# Patient Record
Sex: FEMALE | Race: WHITE | Employment: UNEMPLOYED | URBAN - METROPOLITAN AREA
[De-identification: names, ages, dates, MRNs, and addresses within clinical notes are randomized per-mention and may not be internally consistent; named-entity substitution may affect disease eponyms.]

---

## 2017-01-03 ENCOUNTER — HOSPITAL ENCOUNTER (EMERGENCY)
Age: 47
Discharge: HOME | End: 2017-01-03
Attending: EMERGENCY MEDICINE

## 2017-01-03 ENCOUNTER — APPOINTMENT (OUTPATIENT)
Dept: GENERAL RADIOLOGY | Age: 47
End: 2017-01-03
Attending: EMERGENCY MEDICINE

## 2017-01-03 VITALS
HEART RATE: 101 BPM | DIASTOLIC BLOOD PRESSURE: 72 MMHG | BODY MASS INDEX: 31 KG/M2 | OXYGEN SATURATION: 98 % | RESPIRATION RATE: 16 BRPM | TEMPERATURE: 98 F | SYSTOLIC BLOOD PRESSURE: 121 MMHG | WEIGHT: 188 LBS

## 2017-01-03 DIAGNOSIS — J40 BRONCHITIS: Primary | ICD-10-CM

## 2017-01-03 PROCEDURE — 71020 XR CHEST PA + LAT CHEST (CPT=71020): CPT

## 2017-01-03 PROCEDURE — 99283 EMERGENCY DEPT VISIT LOW MDM: CPT

## 2017-01-03 RX ORDER — CEFDINIR 300 MG/1
300 CAPSULE ORAL 2 TIMES DAILY
COMMUNITY
End: 2017-01-21 | Stop reason: ALTCHOICE

## 2017-01-03 RX ORDER — METHYLPREDNISOLONE 4 MG/1
TABLET ORAL
Qty: 1 PACKAGE | Refills: 0 | Status: SHIPPED | OUTPATIENT
Start: 2017-01-03 | End: 2017-01-08

## 2017-01-03 RX ORDER — IPRATROPIUM BROMIDE AND ALBUTEROL SULFATE 2.5; .5 MG/3ML; MG/3ML
3 SOLUTION RESPIRATORY (INHALATION) ONCE
Status: COMPLETED | OUTPATIENT
Start: 2017-01-03 | End: 2017-01-03

## 2017-01-03 NOTE — ED INITIAL ASSESSMENT (HPI)
PT TO ED FOR COUGH. 3 WEEKS AGO PLACED ON ANTIBX AND STEROIDS FOR BRONCHITIS. SECOND ROUND STEROIDS 12/22 FOR CONTINUED COUGH AND WHEEZING. 12/26 WAS SEEN FOR FOLLOW UP AND HAD CHEST XRAY AND DC WITH BREATHING TREATMENTS.  BREATHING AND COUGH WORSE AND WENT

## 2017-01-03 NOTE — ED PROVIDER NOTES
Patient Seen in: THE [facility] Emergency Department In [place]    History   Patient presents with:  Dyspnea HUEY SOB (respiratory)    Stated Complaint: coughing x 1 week. had antibiotic and prednisone    HPI  Patient is a 60-year-old female who states that fo AFFECTED AREA OF SCALP TWICE DAILY FOR 2 WEEKS   BETAMETHASONE DIPROPIONATE 0.05 % External Lotion,  APPLY TO THE AFFECTED AREA OF SCALP TWICE DAILY FOR 2 WEEKS AS NEEDED   Ketoconazole 2 % External Shampoo,  Apply to AA's of scalp 2-3 times a week.    Phen ED Triage Vitals   BP 01/03/17 1618 121/72 mmHg   Pulse 01/03/17 1618 101   Resp 01/03/17 1618 16   Temp 01/03/17 1618 97.8 °F (36.6 °C)   Temp src 01/03/17 1618 Temporal   SpO2 01/03/17 1618 98 %   O2 Device 01/03/17 1618 None (Room air)       [place]

## 2017-01-21 ENCOUNTER — APPOINTMENT (OUTPATIENT)
Dept: GENERAL RADIOLOGY | Age: 47
End: 2017-01-21
Attending: EMERGENCY MEDICINE
Payer: COMMERCIAL

## 2017-01-21 ENCOUNTER — APPOINTMENT (OUTPATIENT)
Dept: CT IMAGING | Age: 47
End: 2017-01-21
Attending: EMERGENCY MEDICINE
Payer: COMMERCIAL

## 2017-01-21 ENCOUNTER — HOSPITAL ENCOUNTER (EMERGENCY)
Age: 47
Discharge: HOME | End: 2017-01-21
Attending: EMERGENCY MEDICINE
Payer: COMMERCIAL

## 2017-01-21 VITALS
SYSTOLIC BLOOD PRESSURE: 125 MMHG | DIASTOLIC BLOOD PRESSURE: 77 MMHG | HEART RATE: 73 BPM | BODY MASS INDEX: 36.91 KG/M2 | RESPIRATION RATE: 18 BRPM | TEMPERATURE: 98 F | OXYGEN SATURATION: 97 % | WEIGHT: 188 LBS | HEIGHT: 60 IN

## 2017-01-21 DIAGNOSIS — R10.9 ABDOMINAL PAIN OF UNKNOWN ETIOLOGY: Primary | ICD-10-CM

## 2017-01-21 LAB
ALBUMIN SERPL-MCNC: 3.5 G/DL (ref 3.5–4.8)
ALP LIVER SERPL-CCNC: 86 U/L (ref 39–100)
ALT SERPL-CCNC: 32 U/L (ref 14–54)
AST SERPL-CCNC: 26 U/L (ref 15–41)
BASOPHILS # BLD AUTO: 0.05 X10(3) UL (ref 0–0.1)
BASOPHILS NFR BLD AUTO: 0.5 %
BILIRUB SERPL-MCNC: 0.3 MG/DL (ref 0.1–2)
BILIRUB UR QL STRIP.AUTO: NEGATIVE
BUN BLD-MCNC: 11 MG/DL (ref 8–20)
CALCIUM BLD-MCNC: 8.8 MG/DL (ref 8.3–10.3)
CHLORIDE: 107 MMOL/L (ref 101–111)
CLARITY UR REFRACT.AUTO: CLEAR
CO2: 24 MMOL/L (ref 22–32)
COLOR UR AUTO: YELLOW
CREAT BLD-MCNC: 0.69 MG/DL (ref 0.55–1.02)
EOSINOPHIL # BLD AUTO: 0.2 X10(3) UL (ref 0–0.3)
EOSINOPHIL NFR BLD AUTO: 2 %
ERYTHROCYTE [DISTWIDTH] IN BLOOD BY AUTOMATED COUNT: 14.2 % (ref 11.5–16)
GLUCOSE BLD-MCNC: 86 MG/DL (ref 70–99)
GLUCOSE UR STRIP.AUTO-MCNC: NEGATIVE MG/DL
HCT VFR BLD AUTO: 43.1 % (ref 34–50)
HGB BLD-MCNC: 13.7 G/DL (ref 12–16)
IMMATURE GRANULOCYTE COUNT: 0.06 X10(3) UL (ref 0–1)
IMMATURE GRANULOCYTE RATIO %: 0.6 %
KETONES UR STRIP.AUTO-MCNC: NEGATIVE MG/DL
LEUKOCYTE ESTERASE UR QL STRIP.AUTO: NEGATIVE
LIPASE: 78 U/L (ref 73–393)
LYMPHOCYTES # BLD AUTO: 2.66 X10(3) UL (ref 0.9–4)
LYMPHOCYTES NFR BLD AUTO: 26.9 %
M PROTEIN MFR SERPL ELPH: 7.2 G/DL (ref 6.1–8.3)
MCH RBC QN AUTO: 28.2 PG (ref 27–33.2)
MCHC RBC AUTO-ENTMCNC: 31.8 G/DL (ref 31–37)
MCV RBC AUTO: 88.9 FL (ref 81–100)
MONOCYTES # BLD AUTO: 0.7 X10(3) UL (ref 0.1–0.6)
MONOCYTES NFR BLD AUTO: 7.1 %
NEUTROPHIL ABS PRELIM: 6.21 X10 (3) UL (ref 1.3–6.7)
NEUTROPHILS # BLD AUTO: 6.21 X10(3) UL (ref 1.3–6.7)
NEUTROPHILS NFR BLD AUTO: 62.9 %
NITRITE UR QL STRIP.AUTO: NEGATIVE
PH UR STRIP.AUTO: 5 [PH] (ref 4.5–8)
PLATELET # BLD AUTO: 236 10(3)UL (ref 150–450)
POTASSIUM SERPL-SCNC: 4 MMOL/L (ref 3.6–5.1)
PROT UR STRIP.AUTO-MCNC: NEGATIVE MG/DL
RBC # BLD AUTO: 4.85 X10(6)UL (ref 3.8–5.1)
RBC UR QL AUTO: NEGATIVE
RED CELL DISTRIBUTION WIDTH-SD: 46.1 FL (ref 35.1–46.3)
SODIUM SERPL-SCNC: 140 MMOL/L (ref 136–144)
SP GR UR STRIP.AUTO: <=1.005 (ref 1–1.03)
UROBILINOGEN UR STRIP.AUTO-MCNC: 0.2 MG/DL
WBC # BLD AUTO: 9.9 X10(3) UL (ref 4–13)

## 2017-01-21 PROCEDURE — 83690 ASSAY OF LIPASE: CPT | Performed by: EMERGENCY MEDICINE

## 2017-01-21 PROCEDURE — 74177 CT ABD & PELVIS W/CONTRAST: CPT

## 2017-01-21 PROCEDURE — 80053 COMPREHEN METABOLIC PANEL: CPT | Performed by: EMERGENCY MEDICINE

## 2017-01-21 PROCEDURE — 71020 XR CHEST PA + LAT CHEST (CPT=71020): CPT

## 2017-01-21 PROCEDURE — 81003 URINALYSIS AUTO W/O SCOPE: CPT | Performed by: EMERGENCY MEDICINE

## 2017-01-21 PROCEDURE — 85025 COMPLETE CBC W/AUTO DIFF WBC: CPT | Performed by: EMERGENCY MEDICINE

## 2017-01-21 PROCEDURE — 99284 EMERGENCY DEPT VISIT MOD MDM: CPT

## 2017-01-21 PROCEDURE — 36415 COLL VENOUS BLD VENIPUNCTURE: CPT

## 2017-01-21 NOTE — ED INITIAL ASSESSMENT (HPI)
Patient states that she has been having bloating for about 1 year. Patient states that she has been on 4 different rounds of steroids for the past month or so because of bronchitis. Patient also states that she is still congested and coughing.

## 2017-01-21 NOTE — ED PROVIDER NOTES
Patient Seen in: [facility] In [place]    History   Patient presents with:  Abdomen/Flank Pain (GI/)  Headache (neurologic)    Stated Complaint: abdominal pain, headache    HPI    Patient presents complaining of upper abdominal bloa 88 MCG Oral Tab,  Take 88 mcg by mouth before breakfast.     Phentermine HCl 37.5 MG Oral Tab,  Take 37.5 mg by mouth every morning before breakfast.         No family history on file.       Smoking Status: Current Some Day Smoker         Packs/Day: 0.00  Y alert and oriented to person, place, and time. She has normal strength. No sensory deficit. Skin: Skin is warm and dry. No rash noted. Nursing note and vitals reviewed.            ED Course     Labs Reviewed   CBC W/ DIFFERENTIAL - Abnormal; Notable for

## 2017-08-22 PROBLEM — R70.0 ESR RAISED: Status: ACTIVE | Noted: 2017-08-22

## 2017-09-27 PROCEDURE — 82570 ASSAY OF URINE CREATININE: CPT | Performed by: FAMILY MEDICINE

## 2017-09-27 PROCEDURE — 82043 UR ALBUMIN QUANTITATIVE: CPT | Performed by: FAMILY MEDICINE

## 2018-05-01 PROCEDURE — 81003 URINALYSIS AUTO W/O SCOPE: CPT | Performed by: FAMILY MEDICINE

## 2018-05-16 PROCEDURE — 88305 TISSUE EXAM BY PATHOLOGIST: CPT | Performed by: RADIOLOGY

## 2018-05-31 PROCEDURE — 81001 URINALYSIS AUTO W/SCOPE: CPT | Performed by: FAMILY MEDICINE

## 2018-06-01 PROBLEM — R94.31 ABNORMAL EKG: Status: ACTIVE | Noted: 2018-06-01

## 2018-06-01 PROBLEM — Z98.890 S/P DECOMPRESSION OF ULNAR NERVE AT ELBOW: Status: ACTIVE | Noted: 2018-06-01

## 2018-06-01 PROBLEM — R94.31 ABNORMAL EKG: Status: RESOLVED | Noted: 2018-06-01 | Resolved: 2018-06-01

## 2018-06-01 PROBLEM — R82.90 ABNORMAL URINALYSIS: Status: ACTIVE | Noted: 2018-06-01

## 2018-07-02 PROCEDURE — 81003 URINALYSIS AUTO W/O SCOPE: CPT | Performed by: FAMILY MEDICINE

## 2018-07-10 PROBLEM — R81 GLYCOSURIA: Status: ACTIVE | Noted: 2018-07-10

## 2018-07-10 PROBLEM — N95.0 POST-MENOPAUSAL BLEEDING: Status: ACTIVE | Noted: 2018-07-10

## 2018-07-18 PROBLEM — M72.2 PLANTAR FASCIITIS, RIGHT: Status: ACTIVE | Noted: 2018-07-18

## 2018-10-10 PROBLEM — R82.90 ABNORMAL URINALYSIS: Status: RESOLVED | Noted: 2018-06-01 | Resolved: 2018-10-10

## 2018-10-24 PROBLEM — IMO0001 SMOKING: Status: ACTIVE | Noted: 2018-10-24

## 2018-10-24 PROBLEM — R70.0 ESR RAISED: Status: RESOLVED | Noted: 2017-08-22 | Resolved: 2018-10-24

## 2018-10-24 PROBLEM — F17.200 SMOKING: Status: ACTIVE | Noted: 2018-10-24

## 2019-12-17 PROBLEM — J01.01 ACUTE RECURRENT MAXILLARY SINUSITIS: Status: ACTIVE | Noted: 2019-12-17

## 2019-12-17 PROBLEM — R20.0 NUMBNESS: Status: ACTIVE | Noted: 2019-12-17

## 2019-12-31 PROBLEM — S03.00XA DISLOCATION OF TEMPOROMANDIBULAR JOINT, INITIAL ENCOUNTER: Status: ACTIVE | Noted: 2019-12-31

## 2019-12-31 PROBLEM — M26.622 ARTHRALGIA OF LEFT TEMPOROMANDIBULAR JOINT: Status: ACTIVE | Noted: 2019-12-31

## 2020-02-04 PROBLEM — J01.01 ACUTE RECURRENT MAXILLARY SINUSITIS: Status: RESOLVED | Noted: 2019-12-17 | Resolved: 2020-02-04

## 2020-02-04 PROBLEM — M54.2 NECK PAIN: Status: ACTIVE | Noted: 2020-02-04

## 2020-03-09 PROBLEM — N95.0 POST-MENOPAUSAL BLEEDING: Status: RESOLVED | Noted: 2018-07-10 | Resolved: 2020-03-09

## 2020-03-09 PROBLEM — R81 GLYCOSURIA: Status: RESOLVED | Noted: 2018-07-10 | Resolved: 2020-03-09

## 2020-12-08 PROBLEM — F41.9 ANXIETY: Status: ACTIVE | Noted: 2020-12-08

## 2020-12-08 PROBLEM — E55.9 VITAMIN D DEFICIENCY: Status: ACTIVE | Noted: 2020-12-08

## 2021-05-24 PROBLEM — Z28.21 COVID-19 VACCINATION DECLINED: Status: ACTIVE | Noted: 2021-05-24

## 2021-06-04 PROBLEM — E66.9 OBESITY (BMI 30-39.9): Status: ACTIVE | Noted: 2021-06-04

## 2021-06-04 PROBLEM — R14.0 ABDOMINAL BLOATING: Status: ACTIVE | Noted: 2021-06-04

## 2021-06-18 PROBLEM — Z88.9 MULTIPLE ALLERGIES: Status: ACTIVE | Noted: 2021-06-18

## 2021-08-13 PROBLEM — K11.8 MASS OF LEFT PAROTID GLAND: Status: ACTIVE | Noted: 2021-08-13

## 2021-08-13 PROBLEM — R06.83 SNORING: Status: ACTIVE | Noted: 2021-08-13

## 2021-08-13 PROBLEM — G43.809 OTHER MIGRAINE WITHOUT STATUS MIGRAINOSUS, NOT INTRACTABLE: Status: ACTIVE | Noted: 2021-08-13

## 2021-08-13 PROBLEM — J01.00 ACUTE MAXILLARY SINUSITIS, RECURRENCE NOT SPECIFIED: Status: ACTIVE | Noted: 2021-08-13

## 2021-08-30 PROBLEM — G47.30 SLEEP APNEA: Status: ACTIVE | Noted: 2021-08-30

## 2021-08-30 PROBLEM — E11.9 TYPE 2 DIABETES MELLITUS WITHOUT COMPLICATION (HCC): Status: ACTIVE | Noted: 2021-08-30

## 2021-10-14 PROBLEM — R51.9 NONINTRACTABLE HEADACHE, UNSPECIFIED CHRONICITY PATTERN, UNSPECIFIED HEADACHE TYPE: Status: ACTIVE | Noted: 2021-10-14

## 2021-10-14 PROBLEM — L29.9 ITCHING: Status: ACTIVE | Noted: 2021-10-14

## 2021-12-17 PROBLEM — J01.00 ACUTE MAXILLARY SINUSITIS, RECURRENCE NOT SPECIFIED: Status: RESOLVED | Noted: 2021-08-13 | Resolved: 2021-12-17

## 2021-12-17 PROBLEM — L29.9 ITCHING: Status: RESOLVED | Noted: 2021-10-14 | Resolved: 2021-12-17

## 2022-01-20 PROBLEM — I65.23 CAROTID ARTERY PLAQUE, BILATERAL: Status: ACTIVE | Noted: 2022-01-20

## 2022-01-20 PROBLEM — J44.89 CHRONIC OBSTRUCTIVE ASTHMA (HCC): Status: ACTIVE | Noted: 2022-01-20

## 2022-01-20 PROBLEM — J44.89 CHRONIC OBSTRUCTIVE ASTHMA: Status: ACTIVE | Noted: 2022-01-20

## 2022-01-20 PROBLEM — R06.83 SNORING: Status: RESOLVED | Noted: 2021-08-13 | Resolved: 2022-01-20

## 2022-01-20 PROBLEM — M26.609 TMJ (TEMPOROMANDIBULAR JOINT SYNDROME): Status: ACTIVE | Noted: 2019-12-31

## 2022-01-20 PROBLEM — J44.9 CHRONIC OBSTRUCTIVE ASTHMA (HCC): Status: ACTIVE | Noted: 2022-01-20

## 2022-02-17 PROBLEM — H81.10 BENIGN PAROXYSMAL POSITIONAL VERTIGO, UNSPECIFIED LATERALITY: Status: ACTIVE | Noted: 2022-02-17

## 2022-08-26 ENCOUNTER — ORDER TRANSCRIPTION (OUTPATIENT)
Dept: SLEEP CENTER | Age: 52
End: 2022-08-26

## 2022-08-26 DIAGNOSIS — R06.83 SNORING: Primary | ICD-10-CM

## 2023-02-17 ENCOUNTER — TELEPHONE (OUTPATIENT)
Dept: SLEEP CENTER | Age: 53
End: 2023-02-17

## 2023-03-28 ENCOUNTER — OFFICE VISIT (OUTPATIENT)
Dept: SLEEP CENTER | Age: 53
End: 2023-03-28
Attending: Other
Payer: MEDICAID

## 2023-03-28 DIAGNOSIS — R06.83 SNORING: ICD-10-CM

## 2023-03-28 PROCEDURE — 95806 SLEEP STUDY UNATT&RESP EFFT: CPT

## 2024-02-13 RX ORDER — HYDROCODONE BITARTRATE AND ACETAMINOPHEN 5; 325 MG/1; MG/1
1 TABLET ORAL EVERY 6 HOURS PRN
COMMUNITY

## 2024-02-15 ENCOUNTER — ANESTHESIA EVENT (OUTPATIENT)
Dept: ENDOSCOPY | Facility: HOSPITAL | Age: 54
End: 2024-02-15
Payer: MEDICAID

## 2024-02-16 ENCOUNTER — ANESTHESIA (OUTPATIENT)
Dept: ENDOSCOPY | Facility: HOSPITAL | Age: 54
End: 2024-02-16
Payer: MEDICAID

## 2024-02-16 ENCOUNTER — HOSPITAL ENCOUNTER (OUTPATIENT)
Facility: HOSPITAL | Age: 54
Setting detail: HOSPITAL OUTPATIENT SURGERY
Discharge: HOME | End: 2024-02-16
Attending: INTERNAL MEDICINE | Admitting: INTERNAL MEDICINE
Payer: MEDICAID

## 2024-02-16 VITALS
DIASTOLIC BLOOD PRESSURE: 70 MMHG | SYSTOLIC BLOOD PRESSURE: 114 MMHG | BODY MASS INDEX: 38.25 KG/M2 | RESPIRATION RATE: 18 BRPM | HEART RATE: 92 BPM | OXYGEN SATURATION: 94 % | TEMPERATURE: 98 F | HEIGHT: 60.5 IN | WEIGHT: 200 LBS

## 2024-02-16 LAB — GLUCOSE BLD-MCNC: 125 MG/DL (ref 70–99)

## 2024-02-16 PROCEDURE — 82962 GLUCOSE BLOOD TEST: CPT

## 2024-02-16 PROCEDURE — 0DB68ZX EXCISION OF STOMACH, VIA NATURAL OR ARTIFICIAL OPENING ENDOSCOPIC, DIAGNOSTIC: ICD-10-PCS | Performed by: INTERNAL MEDICINE

## 2024-02-16 PROCEDURE — 88305 TISSUE EXAM BY PATHOLOGIST: CPT | Performed by: INTERNAL MEDICINE

## 2024-02-16 PROCEDURE — 0DB58ZX EXCISION OF ESOPHAGUS, VIA NATURAL OR ARTIFICIAL OPENING ENDOSCOPIC, DIAGNOSTIC: ICD-10-PCS | Performed by: INTERNAL MEDICINE

## 2024-02-16 RX ORDER — LIDOCAINE HYDROCHLORIDE 10 MG/ML
INJECTION, SOLUTION EPIDURAL; INFILTRATION; INTRACAUDAL; PERINEURAL AS NEEDED
Status: DISCONTINUED | OUTPATIENT
Start: 2024-02-16 | End: 2024-02-16 | Stop reason: SURG

## 2024-02-16 RX ORDER — DEXTROSE MONOHYDRATE 25 G/50ML
50 INJECTION, SOLUTION INTRAVENOUS
Status: DISCONTINUED | OUTPATIENT
Start: 2024-02-16 | End: 2024-02-16

## 2024-02-16 RX ORDER — NICOTINE POLACRILEX 4 MG
30 LOZENGE BUCCAL
Status: DISCONTINUED | OUTPATIENT
Start: 2024-02-16 | End: 2024-02-16

## 2024-02-16 RX ORDER — SODIUM CHLORIDE, SODIUM LACTATE, POTASSIUM CHLORIDE, CALCIUM CHLORIDE 600; 310; 30; 20 MG/100ML; MG/100ML; MG/100ML; MG/100ML
INJECTION, SOLUTION INTRAVENOUS CONTINUOUS
Status: DISCONTINUED | OUTPATIENT
Start: 2024-02-16 | End: 2024-02-16

## 2024-02-16 RX ORDER — NICOTINE POLACRILEX 4 MG
15 LOZENGE BUCCAL
Status: DISCONTINUED | OUTPATIENT
Start: 2024-02-16 | End: 2024-02-16

## 2024-02-16 RX ADMIN — LIDOCAINE HYDROCHLORIDE 25 MG: 10 INJECTION, SOLUTION EPIDURAL; INFILTRATION; INTRACAUDAL; PERINEURAL at 13:55:00

## 2024-02-16 RX ADMIN — SODIUM CHLORIDE, SODIUM LACTATE, POTASSIUM CHLORIDE, CALCIUM CHLORIDE: 600; 310; 30; 20 INJECTION, SOLUTION INTRAVENOUS at 13:52:00

## 2024-02-16 NOTE — ANESTHESIA PREPROCEDURE EVALUATION
PRE-OP EVALUATION    Patient Name: Keshia Holloway    Admit Diagnosis: GERD WITHOUT ESOPHAGITIS; DYSPHAGIA, UNSPECIFIED TYPE    Pre-op Diagnosis: GERD WITHOUT ESOPHAGITIS; DYSPHAGIA, UNSPECIFIED TYPE    ESOPHAGOGASTRODUODENOSCOPY (EGD)    Anesthesia Procedure: ESOPHAGOGASTRODUODENOSCOPY (EGD)    Surgeon(s) and Role:     * Teo Beach MD - Primary    Pre-op vitals reviewed.  Temp: 98.2 °F (36.8 °C)  Pulse: 91  Resp: 18  BP: 142/85  SpO2: 98 %  Body mass index is 38.42 kg/m².    Current medications reviewed.  Hospital Medications:   glucose (Dex4) 15 GM/59ML oral liquid 15 g  15 g Oral Q15 Min PRN    Or    glucose (Glutose) 40% oral gel 15 g  15 g Oral Q15 Min PRN    Or    glucose-vitamin C (Dex-4) chewable tab 4 tablet  4 tablet Oral Q15 Min PRN    Or    dextrose 50% injection 50 mL  50 mL Intravenous Q15 Min PRN    Or    glucose (Dex4) 15 GM/59ML oral liquid 30 g  30 g Oral Q15 Min PRN    Or    glucose (Glutose) 40% oral gel 30 g  30 g Oral Q15 Min PRN    Or    glucose-vitamin C (Dex-4) chewable tab 8 tablet  8 tablet Oral Q15 Min PRN    lactated ringers infusion   Intravenous Continuous       Outpatient Medications:     Medications Prior to Admission   Medication Sig Dispense Refill Last Dose    Dulaglutide (TRULICITY SC) Inject into the skin once a week. sunday 2/11/2024    Mometasone Furo-Formoterol Fum (DULERA IN) Inhale into the lungs.   2/15/2024    lidocaine-menthol 4-1 % External Patch Place 1 patch onto the skin daily.       HYDROcodone-acetaminophen 5-325 MG Oral Tab Take 1 tablet by mouth every 6 (six) hours as needed for Pain.   2/15/2024    ACCU-CHEK RONDA PLUS In Vitro Strip 1 each by Other route daily. 90 strip 0     ACCU-CHEK MULTICLIX LANCETS Does not apply Misc 1 each by Other route daily. 90 each 0     albuterol (VENTOLIN HFA) 108 (90 Base) MCG/ACT Inhalation Aero Soln INHALE 1 PUFF EVERY 4 HOURS AS NEEDED FOR WHEEZING 18 g 1 2/15/2024    levothyroxine (SYNTHROID) 75 MCG Oral Tab Take 1  tablet (75 mcg total) by mouth before breakfast. 90 tablet 1 2/15/2024    Phentermine HCl 37.5 MG Oral Tab Take 1 tablet (37.5 mg total) by mouth as needed.   Past Week    Azelastine HCl 0.1 % Nasal Solution 1 spray by Nasal route 2 (two) times daily. 1 each 0     estradiol 0.1 MG/GM Vaginal Cream PLACE 2 GRAMS VAGINALLY EVERY NIGHT AT BEDTIME FOR 2 WEEKS AS DIRECTED THEN PLACE 1 GRAM VAGINALLY 2 TIMES EVERY WEEK       Blood Glucose Monitoring Suppl (ONETOUCH VERIO FLEX SYSTEM) w/Device Does not apply Kit Use to check blood sugar twice daily Dx: E11.65, no insulin 1 kit 0     Glucose Blood (ONETOUCH VERIO) In Vitro Strip Patient to test twice a day. Dx e11.9 180 strip 3     Olopatadine HCl 0.2 % Ophthalmic Solution INT 1 GTT INTO OU QD PRN   2/15/2024    NURTEC 75 MG Oral Tablet Dispersible        triamcinolone acetonide 0.1 % External Cream         diphenhydrAMINE HCl 25 MG Oral Cap Take 1 capsule (25 mg total) by mouth every 6 (six) hours as needed for Itching.   2/15/2024    ondansetron 4 MG Oral Tablet Dispersible ondansetron 4 mg disintegrating tablet q 12 hours prn 20 tablet 0 Past Month    lidocaine 5 % External Ointment Apply 1 Application topically 2 (two) times daily. 1 Tube 3     Glucose Blood (ONETOUCH VERIO) In Vitro Strip Use to check blood sugar twice daily Dx: E11.65, no insulin 200 strip 3     OneTouch Delica Lancets 33G Does not apply Misc Use to check blood sugar twice daily Dx: E11.65, no insulin 200 each 3     SUMAtriptan Succinate 100 MG Oral Tab 75 mg.   More than a month    nystatin [id number] UNIT/GM External Ointment PAIGE TO GROIN Q NIGHT          Allergies: Hydrocodone-acetaminophen, Ibuprofen, Morphine, and Naprosyn  [naproxen]      Anesthesia Evaluation    Patient summary reviewed.    Anesthetic Complications  (-) history of anesthetic complications         GI/Hepatic/Renal    Negative GI/hepatic/renal ROS.                             Cardiovascular        Exercise tolerance: good     MET:  >4    (+) obesity                                       Endo/Other      (+) diabetes  type 2,   (+) hypothyroidism                       Pulmonary      (+) asthma  (+) COPD            (+) sleep apnea       Neuro/Psych                   (+) headaches                   Past Surgical History:   Procedure Laterality Date          D & C  2018    ELBOW SURGERY Right     2016    KNEE SURGERY Right 10/2015    NEEDLE BIOPSY RIGHT  2018    US guided bx Right @ 10:00, 8cm FN= Infinity clip    OTHER SURGICAL HISTORY      septoplasty    OTHER SURGICAL HISTORY      invitro    TONSILLECTOMY      WISDOM TEETH REMOVED       Social History     Socioeconomic History    Marital status:    Tobacco Use    Smoking status: Some Days     Packs/day: 0.50     Years: 15.00     Additional pack years: 0.00     Total pack years: 7.50     Types: Cigarettes    Smokeless tobacco: Never    Tobacco comments:     currently down to half a pack a day   Vaping Use    Vaping Use: Never used   Substance and Sexual Activity    Alcohol use: No     Comment: 1 year    Drug use: No     History   Drug Use No     Available pre-op labs reviewed.               Airway      Mallampati: III  Mouth opening: >3 FB  TM distance: > 6 cm  Neck ROM: full Cardiovascular    Cardiovascular exam normal.  Rhythm: regular  Rate: normal     Dental    Dentition appears grossly intact         Pulmonary    Pulmonary exam normal.  Breath sounds clear to auscultation bilaterally.               Other findings              ASA: 3   Plan: MAC  NPO status verified and patient meets guidelines.  Patient has not taken beta blockers in last 24 hours.  Post-procedure pain management plan discussed with surgeon and patient.      Plan/risks discussed with: patient                Present on Admission:  **None**

## 2024-02-16 NOTE — H&P
History and Physical for Endoscopic Procedure      Keshia Holloway Patient Status:  Hospital Outpatient Surgery    10/4/1970 MRN [id number]   Location [facility] Attending Teo Beach MD   Hosp Day # 0 PCP James Oreilly MD     Date of Consult:  24    Reason for Consultation:  dysphagia    History of Present Illness:  Keshia Holloway is a a(n) 53 year old female.     History:  Past Medical History:   Diagnosis Date    Amenorrhea 2014    Anesthesia complication     under lite sed woke up    Anxiety state     Asthma     Diabetes (HCC)     Disorder of thyroid     Edema 2013    Itching 10/14/2021    Migraines     Mild single current episode of major depressive disorder (HCC) 04/15/2016    Post-menopausal bleeding 07/10/2018    Thyroid disease      Past Surgical History:   Procedure Laterality Date          D & C  2018    ELBOW SURGERY Right     2016    KNEE SURGERY Right 10/2015    NEEDLE BIOPSY RIGHT  2018    US guided bx Right @ 10:00, 8cm FN= Infinity clip    OTHER SURGICAL HISTORY      septoplasty    OTHER SURGICAL HISTORY      invitro    TONSILLECTOMY      WISDOM TEETH REMOVED       Family History   Problem Relation Age of Onset    Diabetes Father     Heart Disorder Father     Cancer Mother       reports that she has been smoking cigarettes. She has a 7.5 pack-year smoking history. She has never used smokeless tobacco. She reports that she does not drink alcohol and does not use drugs.    Allergies:  Allergies   Allergen Reactions    Hydrocodone-Acetaminophen HIVES    Ibuprofen HIVES    Morphine     Naprosyn  [Naproxen] Tightness in Chest       Medications:  No current facility-administered medications for this encounter.    Review of Systems:  Gastrointestinal: negative other than specified in the HPI  General: negative other than specified in the HPI  Neurological: negative other than specified in the HPI  Cardiovascular: negative  other than specified in the HPI  Respiratory: negative other than specified in the HPI    Physical Exam:  No acute distress  RRR  CTA B/L  SOFT +BS    Assessment/Plan:  Patient Active Problem List   Diagnosis    Atopic rhinitis    Multiple food allergies    Celiac disease    Cervical radiculopathy    Diabetes mellitus (HCC)    Abnormal C-reactive protein    Elevated cortisol level    Diffuse goiter    Overweight    Premature menopause    Simple renal cyst    Tarsal tunnel syndrome    Chronic venous insufficiency    Acquired hypothyroidism    Plantar fasciitis of left foot    S/P decompression of ulnar nerve at elbow    Plantar fasciitis, right    Smoking    Numbness    TMJ (temporomandibular joint syndrome)    Arthralgia of left temporomandibular joint    Neck pain    Vitamin D deficiency    Anxiety    COVID-19 vaccination declined    Obesity (BMI 30-39.9)    Abdominal bloating    Multiple allergies    Other migraine without status migrainosus, not intractable    Mass of left parotid gland    Type 2 diabetes mellitus without complication (HCC)    Sleep apnea    Nonintractable headache, unspecified chronicity pattern, unspecified headache type    Chronic obstructive asthma    Carotid artery plaque, bilateral    Benign paroxysmal positional vertigo, unspecified laterality       EGD today.    Teo Beach MD  2/16/2024  1:15 PM

## 2024-02-16 NOTE — OPERATIVE REPORT
EGD Operative Report    Keshia Holloway Patient Status:  [facility] Outpatient Surgery    10/4/1970 MRN [id number]   [facility] END[facility]COPY [facility]IN [facility] Attending Teo Beach MD   Hosp Day #   0 PCP James Oreilly MD       Pre-op Diagnosis: GERD WITHOUT ESOPHAGITIS; DYSPHAGIA, UNSPECIFIED TYPE     Post-Op Diagnosis:    ESOPHAGUS:  normal.  No esophagitis, strictures found.  Proximal biopsies taken to r/o EoE   STOMACH:  lap band identified with circumferential compression of the fundus 4 cm distal to the GE junction.  Random gastric biopsies taken.   DUODENUM:  normal.    Procedure Performed: EGD with biopsy    Informed Consent: Informed consent for both the procedure and sedation were obtained from the patient. The potentially life-threatening complications of sedation, bleeding,  Perforation, transfusion or repeat endoscopy were reviewed along with the possible need for hospitalization, surgical management, transfusion or repeat endoscopy should one of these complications arise. The patient understands and is agreeable to proceed.  Sedation Type: MAC-Patient received sedation with monitored anesthesia provided by an anesthesiologist  Moderate Sedation Time: None.  Deep sedation provided by anesthesia.  Procedure Description: The patient was placed in the left lateral decubitus position.  A bite block was placed in the patient’s mouth.  The endoscope was inserted through the mouth and advanced under direct visualization to the 3rd portion of the duodenum and was then withdrawn to examine the duodenal bulb and gastric antrum.  The endoscope was then retroflexed to examine the angulus, GE junction, cardia, body and fundus and then withdrawn to examine the esophagus. The endoscope was then removed from the patient. The patient tolerated the procedure well  with no immediate complications and was transferred to the recovery area in stable condition.  Findings:    ESOPHAGUS:  normal.  No esophagitis, strictures found.  Proximal biopsies taken to r/o EoE   STOMACH:  lap band identified with circumferential compression of the fundus 4 cm distal to the GE junction.  Random gastric biopsies taken.   DUODENUM:  normal.    Recommendations:   Await pathology  Trial of omeprazole 40 mg daily.    Discharge:  The patient was given an after visit summary detailing the procedure, findings, recommendations and follow up plans.  Teo Beach MD  2/16/2024  2:02 PM

## 2024-02-16 NOTE — DISCHARGE INSTRUCTIONS
FINDINGS:  1.  Other than the lap band, I did not find any abnormalities.  2.  I took biopsies of the esophagus and stomach    PLAN:  1.  Await the biopsies  2.  Start a trial of Omeprazole 40 mg daily for the next 3 months and see how this feels.  This prescription was sent to your pharmacy and you can pick it up today.      If you have any questions, please call us at [phone].    Thank you,  Teo Beach MD        Home Care Instructions for Gastroscopy with Sedation    Diet:  - Resume your regular diet as tolerated unless otherwise instructed.  - Start with light meals to minimize bloating.  - Do not drink alcohol today.    Medication:  - If you have questions about resuming your normal medications, please contact your Primary Care Physician.    Activities:  - Take it easy today. Do not return to work today.  - Do not drive today.  - Do not operate any machinery today (including kitchen equipment).    Gastroscopy:  - You may have a sore throat for 2-3 days following the exam. This is normal. Gargling with warm salt water (1/2 tsp salt to 1 glass warm water) or using throat lozenges will help.  - If you experience any sharp pain in your neck, abdomen or chest, vomiting of blood, oral temperature over 100 degrees Fahrenheit, light-headedness or dizziness, or any other problems, contact your doctor.    **If unable to reach your doctor, please go to the [facility] Emergency Room**    - Your referring physician will receive a full report of your examination.  - If you do not hear from your doctor's office within two weeks of your biopsy, please call them for your results.

## 2024-02-16 NOTE — ANESTHESIA POSTPROCEDURE EVALUATION
[facility]    Keshia Holloway Patient Status:  Hospital Outpatient Surgery   Age/Gender 53 year old female MRN [id number]   Location [facility] ENDOSCOPY PAIN CENTER Attending Teo Beach MD   Hosp Day # 0 PCP James Oreilly MD       Anesthesia Post-op Note    ESOPHAGOGASTRODUODENOSCOPY (EGD) with biopsies    Procedure Summary       Date: 02/16/24 Room / Location:  ENDOSCOPY 03 / [facility]    Anesthesia Start: 1352 Anesthesia Stop: 1406    Procedure: ESOPHAGOGASTRODUODENOSCOPY (EGD) with biopsies Diagnosis: (NORMAL)    Surgeons: Teo Beach MD Anesthesiologist: Hector Beach MD    Anesthesia Type: MAC ASA Status: 3            Anesthesia Type: MAC    Vitals Value Taken Time   /63 02/16/24 1420   Temp 98.0 02/16/24 1424   Pulse 88 02/16/24 1424   Resp 16 02/16/24 1424   SpO2 94 % 02/16/24 1424   Vitals shown include unfiled device data.    Patient Location: Endoscopy    Anesthesia Type: MAC    Airway Patency: patent    Postop Pain Control: adequate    Mental Status: mildly sedated but able to meaningfully participate in the post-anesthesia evaluation    Nausea/Vomiting: none    Cardiopulmonary/Hydration status: stable euvolemic    Complications: no apparent anesthesia related complications    Postop vital signs: stable    Dental Exam: Unchanged from Preop    Patient to be discharged home when criteria met.

## 2025-07-08 RX ORDER — ALBUTEROL SULFATE 0.83 MG/ML
SOLUTION RESPIRATORY (INHALATION) EVERY 4 HOURS PRN
COMMUNITY

## 2025-07-08 RX ORDER — FERROUS SULFATE 325(65) MG
325 TABLET, DELAYED RELEASE (ENTERIC COATED) ORAL EVERY OTHER DAY
COMMUNITY

## 2025-07-08 RX ORDER — MOMETASONE FUROATE AND FORMOTEROL FUMARATE DIHYDRATE 200; 5 UG/1; UG/1
2 AEROSOL RESPIRATORY (INHALATION) 2 TIMES DAILY
COMMUNITY

## 2025-07-08 RX ORDER — MULTIVITAMIN WITH IRON
250 TABLET ORAL EVERY OTHER DAY
COMMUNITY

## 2025-07-08 RX ORDER — DAPAGLIFLOZIN 10 MG/1
10 TABLET, FILM COATED ORAL DAILY
COMMUNITY

## 2025-07-08 RX ORDER — HYDROCORTISONE 25 MG/G
CREAM TOPICAL 2 TIMES DAILY
COMMUNITY

## 2025-07-08 RX ORDER — ROSUVASTATIN CALCIUM 10 MG/1
10 TABLET, COATED ORAL NIGHTLY
COMMUNITY

## 2025-07-17 ENCOUNTER — ANESTHESIA (OUTPATIENT)
Dept: ENDOSCOPY | Facility: HOSPITAL | Age: 55
End: 2025-07-17
Payer: MEDICAID

## 2025-07-17 ENCOUNTER — ANESTHESIA EVENT (OUTPATIENT)
Dept: ENDOSCOPY | Facility: HOSPITAL | Age: 55
End: 2025-07-17
Payer: MEDICAID

## 2025-07-17 ENCOUNTER — HOSPITAL ENCOUNTER (OUTPATIENT)
Facility: HOSPITAL | Age: 55
Setting detail: HOSPITAL OUTPATIENT SURGERY
Discharge: HOME | End: 2025-07-17
Attending: INTERNAL MEDICINE | Admitting: INTERNAL MEDICINE
Payer: MEDICAID

## 2025-07-17 VITALS
HEART RATE: 77 BPM | WEIGHT: 185 LBS | SYSTOLIC BLOOD PRESSURE: 105 MMHG | HEIGHT: 60.5 IN | OXYGEN SATURATION: 96 % | DIASTOLIC BLOOD PRESSURE: 67 MMHG | BODY MASS INDEX: 35.38 KG/M2 | TEMPERATURE: 97 F | RESPIRATION RATE: 18 BRPM

## 2025-07-17 LAB — GLUCOSE BLD-MCNC: 120 MG/DL (ref 70–99)

## 2025-07-17 PROCEDURE — 82962 GLUCOSE BLOOD TEST: CPT

## 2025-07-17 PROCEDURE — 88305 TISSUE EXAM BY PATHOLOGIST: CPT | Performed by: INTERNAL MEDICINE

## 2025-07-17 RX ORDER — SODIUM CHLORIDE, SODIUM LACTATE, POTASSIUM CHLORIDE, CALCIUM CHLORIDE 600; 310; 30; 20 MG/100ML; MG/100ML; MG/100ML; MG/100ML
INJECTION, SOLUTION INTRAVENOUS CONTINUOUS
Status: DISCONTINUED | OUTPATIENT
Start: 2025-07-17 | End: 2025-07-17

## 2025-07-17 RX ORDER — NICOTINE POLACRILEX 4 MG
15 LOZENGE BUCCAL
Status: DISCONTINUED | OUTPATIENT
Start: 2025-07-17 | End: 2025-07-17

## 2025-07-17 RX ORDER — LIDOCAINE HYDROCHLORIDE 10 MG/ML
INJECTION, SOLUTION EPIDURAL; INFILTRATION; INTRACAUDAL; PERINEURAL AS NEEDED
Status: DISCONTINUED | OUTPATIENT
Start: 2025-07-17 | End: 2025-07-17 | Stop reason: SURG

## 2025-07-17 RX ORDER — NICOTINE POLACRILEX 4 MG
30 LOZENGE BUCCAL
Status: DISCONTINUED | OUTPATIENT
Start: 2025-07-17 | End: 2025-07-17

## 2025-07-17 RX ORDER — DEXTROSE MONOHYDRATE 25 G/50ML
50 INJECTION, SOLUTION INTRAVENOUS
Status: DISCONTINUED | OUTPATIENT
Start: 2025-07-17 | End: 2025-07-17

## 2025-07-17 RX ADMIN — SODIUM CHLORIDE, SODIUM LACTATE, POTASSIUM CHLORIDE, CALCIUM CHLORIDE: 600; 310; 30; 20 INJECTION, SOLUTION INTRAVENOUS at 13:03:00

## 2025-07-17 RX ADMIN — LIDOCAINE HYDROCHLORIDE 25 MG: 10 INJECTION, SOLUTION EPIDURAL; INFILTRATION; INTRACAUDAL; PERINEURAL at 13:05:00

## 2025-07-17 NOTE — H&P
History and Physical for Endoscopic Procedure      Keshia Holloway Patient Status:  Hospital Outpatient Surgery    10/4/1970 MRN [id number]   [facility] ENDOSCOPY [facility]IN [facility] Attending Teo Beach MD   Hosp Day # 0 PCP James Oreilly MD     Date of Consult:  25    Reason for Consultation:  family history of colon cancer      History of Present Illness:  Keshia Holloway is a a(n) 54 year old female.     History:  Past Medical History[1]  Past Surgical History[2]  Family History[3]   reports that she has been smoking cigarettes. She has a 7.5 pack-year smoking history. She has never used smokeless tobacco. She reports current alcohol use. She reports that she does not use drugs.    Allergies:  Allergies[4]    Medications:  Current Hospital Medications[5]    Review of Systems:  Gastrointestinal: negative other than specified in the HPI  General: negative other than specified in the HPI  Neurological: negative other than specified in the HPI  Cardiovascular: negative other than specified in the HPI  Respiratory: negative other than specified in the HPI    Physical Exam:  No acute distress  RRR  CTA B/L  SOFT +BS    Assessment/Plan:  Problem List[6]    Colonoscopy today.    Teo Beach MD  2025  1:05 PM         [1]   Past Medical History:   Amenorrhea    Anesthesia complication    under lite sed woke up    Anxiety state    Arrhythmia    HEART MURMUR SINCE CHILDREN    Asthma (HCC)    Diabetes (HCC)    Disorder of thyroid    Edema    High cholesterol    Itching    Migraines    Mild single current episode of major depressive disorder    Post-menopausal bleeding    Thyroid disease    Visual impairment   [2]   Past Surgical History:  Procedure Laterality Date    Adjustment gastric band            D & c  2018    Elbow surgery Right     2016    Foot surgery Right     Knee surgery Right 10/2015    Needle biopsy right  2018    US guided bx Right @ 10:00, 8cm FN= Infinity  clip    Other surgical history      septoplasty    Other surgical history      invitro    Other surgical history Left     LEFT ELBOW AND LEFT WRIST MANIPULATION DUE TO PAIN    Tonsillectomy      Bronson teeth removed     [3]   Family History  Problem Relation Age of Onset    Diabetes Father     Heart Disorder Father     Cancer Mother    [4]   Allergies  Allergen Reactions    Hydrocodone-Acetaminophen HIVES     If low dose taken, can tolerate it with benadryl.    Morphine OTHER (SEE COMMENTS)     BURNED SKIN    Ibuprofen HIVES    Naprosyn  [Naproxen] Tightness in Chest   [5]   Current Facility-Administered Medications:     glucose (Dex4) 15 GM/59ML oral liquid 15 g, 15 g, Oral, Q15 Min PRN **OR** glucose (Glutose) 40% oral gel 15 g, 15 g, Oral, Q15 Min PRN **OR** glucose-vitamin C (Dex-4) chewable tab 4 tablet, 4 tablet, Oral, Q15 Min PRN **OR** dextrose 50% injection 50 mL, 50 mL, Intravenous, Q15 Min PRN **OR** glucose (Dex4) 15 GM/59ML oral liquid 30 g, 30 g, Oral, Q15 Min PRN **OR** glucose (Glutose) 40% oral gel 30 g, 30 g, Oral, Q15 Min PRN **OR** glucose-vitamin C (Dex-4) chewable tab 8 tablet, 8 tablet, Oral, Q15 Min PRN    lactated ringers infusion, , Intravenous, Continuous    Facility-Administered Medications Ordered in Other Encounters:     lidocaine PF (Xylocaine-MPF) 1% injection, , Intravenous, PRN    propofol (Diprivan) 10 mg/mL infusion premix, , Intravenous, Continuous PRN  [6]   Patient Active Problem List  Diagnosis    Atopic rhinitis    Multiple food allergies    Celiac disease (HCC)    Cervical radiculopathy    Diabetes mellitus (HCC)    Abnormal C-reactive protein    Elevated cortisol level    Diffuse goiter    Overweight    Premature menopause    Simple renal cyst    Tarsal tunnel syndrome    Chronic venous insufficiency    Acquired hypothyroidism    Plantar fasciitis of left foot    S/P decompression of ulnar nerve at elbow    Plantar fasciitis, right    Smoking    Numbness    TMJ  (temporomandibular joint syndrome)    Arthralgia of left temporomandibular joint    Neck pain    Vitamin D deficiency    Anxiety    COVID-19 vaccination declined    Obesity (BMI 30-39.9)    Abdominal bloating    Multiple allergies    Other migraine without status migrainosus, not intractable    Mass of left parotid gland    Type 2 diabetes mellitus without complication (HCC)    Sleep apnea    Nonintractable headache, unspecified chronicity pattern, unspecified headache type    Chronic obstructive asthma (HCC)    Carotid artery plaque, bilateral    Benign paroxysmal positional vertigo, unspecified laterality

## 2025-07-17 NOTE — OPERATIVE REPORT
Colonoscopy Operative Report    Keshia Holloway Patient Status:  Hospital Outpatient Surgery    10/4/1970 MRN [id number]   [facility] Attending Teo Beach MD   Hosp Day #   0 PCP James Oreilly MD     Pre-Operative Diagnosis: FAMILY HX OF COLON CANCER    Post-Operative Diagnosis:  5 mm sessile sigmoid polyp removed with a cold snare.    Sigmoid diverticulosis.    Hemorrhoids      Procedure Performed: COLONOSCOPY with snare polypectomy    Informed Consent: Informed consent for both the procedure and sedation were obtained from the patient. The potentially life-threatening complications of sedation, bleeding,  perforation, transfusion or repeat endoscopy  were reviewed along with the possible need for hospitalization, surgical management, transfusion or repeat endoscopy should one of these complications arise. The patient understands and is agreeable to proceed.  Sedation Type: MAC-Patient received sedation with monitored anesthesia provided by an anesthesiologist  Moderate Sedation Time: None.  Deep sedation provided by anesthesia.  Cecum Withdrawal Time:  8 min  Date of previous colonoscopy:     Procedure Description: The patient was placed in the left lateral decubitus position.  After careful digital rectal examination, the Adult colonoscope was inserted into the rectum and advanced to the level of the cecum under direct visualization. The cecum was identified by landmarks, including the appendiceal orifice and ileoceccal valve. Careful examination of the entire colon was performed during withdrawal of the endoscope. The scope was withdrawn to the rectum and retroflexion was performed.  The patient tolerated the procedure well with no immediate complications. The patient was transferred to the recovery area in stable condition.  Quality of Preparation: Adequate  Aronchick Bowel Prep Scale:  2 - good  Findings:   5 mm  sessile sigmoid polyp removed with a cold snare.    Sigmoid diverticulosis.    Hemorrhoids      Recommendations:    Await pathology results.  Further recommendations with regards to when the repeat the colonoscopy will be based on these results.    Discharge:  The patient was given an after visit summary detailing the procedure, findings, recommendations and follow up plans.     Teo Beach MD  7/17/2025  1:06 PM

## 2025-07-17 NOTE — ANESTHESIA PREPROCEDURE EVALUATION
PRE-OP EVALUATION    Patient Name: Keshia Holloway    Admit Diagnosis: FAMILY HX OF COLON CANCER    Pre-op Diagnosis: FAMILY HX OF COLON CANCER    COLONOSCOPY    Anesthesia Procedure: COLONOSCOPY    Surgeons and Role:     * Teo Beach MD - Primary    Pre-op vitals reviewed.        Body mass index is 35.63 kg/m².    Current medications reviewed.  Hospital Medications:  Current Medications[1]    Outpatient Medications:   Prescriptions Prior to Admission[2]    Allergies: Hydrocodone-acetaminophen, Morphine, Ibuprofen, and Naprosyn  [naproxen]      Anesthesia Evaluation    Patient summary reviewed.    Anesthetic Complications  (-) history of anesthetic complications         GI/Hepatic/Renal    Negative GI/hepatic/renal ROS.                             Cardiovascular    Negative cardiovascular ROS.                                                   Endo/Other      (+) diabetes and well controlled,    (+) hypothyroidism                       Pulmonary      (+) asthma              (+) sleep apnea and no treatment      Neuro/Psych        (+) anxiety         (+) neuromuscular disease             Cervical radiculopathy  TMJ Syndrome        Past Surgical History[3]  Social Hx on file[4]  History   Drug Use No     Available pre-op labs reviewed.               Airway      Mallampati: II  Mouth opening: >3 FB  TM distance: 4 - 6 cm  Neck ROM: full Cardiovascular    Cardiovascular exam normal.         Dental             Pulmonary    Pulmonary exam normal.                 Other findings              ASA: 3   Plan: MAC  NPO status verified and patient meets guidelines.          Plan/risks discussed with: patient and spouse                Present on Admission:  **None**             [1]   No current facility-administered medications on file as of .   [2]   No medications prior to admission.   [3]   Past Surgical History:  Procedure Laterality Date          D & c  2018    Elbow surgery Right     2016    Foot  surgery Right     Knee surgery Right 10/2015    Needle biopsy right  05/16/2018    US guided bx Right @ 10:00, 8cm FN= Infinity clip    Other surgical history      septoplasty    Other surgical history      invitro    Other surgical history Left     LEFT ELBOW AND LEFT WRIST MANIPULATION DUE TO PAIN    Tonsillectomy      [place] teeth removed     [4]   Social History  Socioeconomic History    Marital status:    Tobacco Use    Smoking status: Some Days     Current packs/day: 0.50     Average packs/day: 0.5 packs/day for 15.0 years (7.5 ttl pk-yrs)     Types: Cigarettes    Smokeless tobacco: Never    Tobacco comments:     currently down to half a pack a day   Vaping Use    Vaping status: Never Used   Substance and Sexual Activity    Alcohol use: Yes     Comment: 1 year    Drug use: No

## 2025-07-17 NOTE — ANESTHESIA POSTPROCEDURE EVALUATION
[facility]    Keshia Holloway Patient Status:  Hospital Outpatient Surgery   Age/Gender 54 year old female MRN [id number]   Location [facility] Attending Teo Beach MD   Hosp Day # 0 PCP James Oreilly MD       Anesthesia Post-op Note    COLONOSCOPY with cold snare polypectomy    Procedure Summary       Date: 07/17/25 Room / Location:  ENDOSCOPY 03 / [facility]    Anesthesia Start: 1303 Anesthesia Stop: 1327    Procedure: COLONOSCOPY with cold snare polypectomy Diagnosis: (hemorrhoids, polyp, diverticulosis)    Surgeons: Teo Beach MD Anesthesiologist: Amy Borja MD    Anesthesia Type: MAC ASA Status: 3            Anesthesia Type: MAC    Vitals Value Taken Time   /65 07/17/25 13:30   Temp  07/17/25 13:35   Pulse 81 07/17/25 13:35   Resp 17 07/17/25 13:35   SpO2 95 % 07/17/25 13:35   Vitals shown include unfiled device data.        Patient Location: Endoscopy    Anesthesia Type: MAC    Airway Patency: patent    Postop Pain Control: adequate    Mental Status: mildly sedated but able to meaningfully participate in the post-anesthesia evaluation    Nausea/Vomiting: none    Cardiopulmonary/Hydration status: stable euvolemic    Complications: no apparent anesthesia related complications    Postop vital signs: stable    Dental Exam: Unchanged from Preop    Patient to be discharged from PACU when criteria met.

## 2025-07-17 NOTE — DISCHARGE INSTRUCTIONS
FINDINGS:  1.  Just 1 small polyp was found and removed.    2.  There were small pockets in the colon called diverticulosis.  These are considered normal to have.  So long as you have not had any complications with these pockets such as significant rectal bleeding or infections called diverticulitis, then there is nothing recommended that you need to do.      PLAN:  1.  Await the biopsy results.      If you have any questions, please call us at [phone].    Thank you,  Teo Beach MD  Home Care Instructions for Colonoscopy with Sedation    Diet:  - Resume your regular diet   - Start with light meals to minimize bloating.  - Do not drink alcohol today.    Medication:  - If you have questions about resuming your normal medications, please contact your Primary Care Physician.    Activities:  - Take it easy today. Do not return to work today.  - Do not drive today.  - Do not operate any machinery today (including kitchen equipment).    Colonoscopy:  - You may notice some rectal \"spotting\" (a little blood on the toilet tissue) for a day or two after the exam. This is normal.  - If you experience any rectal bleeding (not spotting), persistent tenderness or sharp severe abdominal pains, oral temperature over 100 degrees Fahrenheit, light-headedness or dizziness, or any other problems, contact your doctor.    **If unable to reach your doctor, please go to the [facility]**    - Your referring physician will receive a full report of your examination.  - If you do not hear from your doctor's office within two weeks of your biopsy, please call them for your results.

## 2025-08-09 ENCOUNTER — HOSPITAL ENCOUNTER (OUTPATIENT)
Dept: CT IMAGING | Facility: HOSPITAL | Age: 55
Discharge: HOME | End: 2025-08-09
Attending: OTOLARYNGOLOGY

## 2025-08-09 DIAGNOSIS — R09.82 POSTNASAL DRIP: ICD-10-CM

## 2025-08-09 DIAGNOSIS — J32.9 CHRONIC SINUSITIS, UNSPECIFIED: ICD-10-CM

## 2025-08-09 PROCEDURE — 70486 CT MAXILLOFACIAL W/O DYE: CPT | Performed by: OTOLARYNGOLOGY

## (undated) DEVICE — BITEBLOCK ENDOSCP 60FR MAXI STRP

## (undated) DEVICE — LASSO POLYPECTOMY SNARE: Brand: LASSO

## (undated) DEVICE — KIT VLV 5 PC AIR H2O SUCT BX ENDOGATOR CONN

## (undated) DEVICE — 10FT COMBINED O2 DELIVERY/CO2 MONITORING. FILTER WITH MICROSTREAM TYPE LUER: Brand: DUAL ADULT NASAL CANNULA

## (undated) DEVICE — Device

## (undated) DEVICE — ELECTRODE EKG W3.5XL4CM ABRAD W1.25XL1.5IN

## (undated) DEVICE — KIT CUSTOM ENDOPROCEDURE STERIS

## (undated) DEVICE — CANISTER SUCT 1200CC LID BLU HRD ADPT AUTO

## (undated) DEVICE — 3M™ RED DOT™ MONITORING ELECTRODE WITH FOAM TAPE AND STICKY GEL, 50/BAG, 20/CASE, 72/PLT 2570: Brand: RED DOT™

## (undated) DEVICE — 1200CC GUARDIAN II: Brand: GUARDIAN

## (undated) DEVICE — CANNULA NSL AD W/ FLTR 14FT O2/CO2

## (undated) DEVICE — V2 SPECIMEN COLLECTION MANIFOLD KIT: Brand: NEPTUNE

## (undated) NOTE — ED AVS SNAPSHOT
[facility] in [address] [facility]    Phone:  [phone]    Fax:  [address] [facility]   MRN: [id number]    Department:  [facility] in [place]   Date of V covered by your plan. Please contact your insurance company to determine coverage for follow-up care and referrals.     [address] [phone]  Pediatric 449 [id number] Emergency Department   [phone]       To by a radiologist.  If there is a significant change in your reading, you will be contacted. Please make sure we have your correct phone number before you leave. After you leave, you should follow the attached instructions.      I have read and understand th Nicole 112. Imaging Results         XR CHEST PA + LAT CHEST (CPT=71020) (Final result) Result time:  01/03/17 16:45:39    Final result    Impression:    CONCLUSION:  No active cardiopulmonary process identified.            Dictated by: Lelo Hutchinson

## (undated) NOTE — ED AVS SNAPSHOT
THE [facility] Emergency Department in [address] [facility]    Phone:  [phone]    Fax:  [address] [facility]   MRN: [id number]    Department:  THE [facility] Emergency Department in [place]   Date of V at [phone]    Si usted tiene algun problema con van sequimiento, por favor llame a nuestro adminstrador de casos al [phone]    Expect to receive an electronic request (by e-mail or text) to complete a self-assessment the day after your visit. [facility] ([address]) [address]  ([address]) [address] [facility]. ([address]) [address] [facility] [address] PROCEDURE:  XR CHEST PA + LAT CHEST (CPT=71020)     INDICATIONS:  abdominal pain, headache     COMPARISON:  [facility], XR CHEST PA + LAT CHEST (CPT=71020), 1/03/2017, 16:38.     TECHNIQUE:  PA and lateral chest radiographs were obtained.      PATIENT STAT

## (undated) NOTE — ED AVS SNAPSHOT
THE [facility] Emergency Department in [address] [facility]    Phone:  [phone]    Fax:  [address] [facility]   MRN: [id number]    Department:  THE [facility] Emergency Department in [place]   Date of V IF THERE IS ANY CHANGE OR WORSENING OF YOUR CONDITION, CALL YOUR PRIMARY CARE PHYSICIAN AT ONCE OR RETURN IMMEDIATELY TO THE EMERGENCY DEPARTMENT.     If you have been prescribed any medication(s), please fill your prescription right away and begin taking t

## (undated) NOTE — ED AVS SNAPSHOT
THE [facility] Emergency Department in [address] [facility]    Phone:  [phone]    Fax:  [address] [facility]   MRN: [id number]    Department:  THE [facility] Emergency Department in [place]   Date of V IF THERE IS ANY CHANGE OR WORSENING OF YOUR CONDITION, CALL YOUR PRIMARY CARE PHYSICIAN AT ONCE OR RETURN IMMEDIATELY TO THE EMERGENCY DEPARTMENT.     If you have been prescribed any medication(s), please fill your prescription right away and begin taking t